# Patient Record
(demographics unavailable — no encounter records)

---

## 2024-12-10 NOTE — PLAN
[FreeTextEntry1] : rto for us. change to nortrel ocps, rba dw pt /  check cxs.  after accutane, go off ocps for a month. check day 3 lw.

## 2024-12-10 NOTE — HISTORY OF PRESENT ILLNESS
[FreeTextEntry1] : 15 yo pt here for eval of irreg vb on ocps.  menarche age 12 were irreg, every few months.  started ocps for over a year, getting periods q 2 weeks.  periods lasting 5 days, crampy day before  until day 3-4 wants to leave school sec pain w menses on loloestrin.  meds- accutane, took it last yr x 7 mos, now restarted 2 mos ago  tried junel, tried loestrin ,  tried loloestrin.  denies abnl bleeding w toothbrushing,  gets nosebleeds since on accutane.  no other meds nkda surg- none fam hx - pgf-dm, mga- br ca  co stomach aches.  never had pel us.  denies dc, urinary probs  11th grade.    .